# Patient Record
Sex: FEMALE | Race: WHITE | NOT HISPANIC OR LATINO | URBAN - METROPOLITAN AREA
[De-identification: names, ages, dates, MRNs, and addresses within clinical notes are randomized per-mention and may not be internally consistent; named-entity substitution may affect disease eponyms.]

---
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
## 2022-04-11 ENCOUNTER — HISTORICAL (OUTPATIENT)
Dept: ADMINISTRATIVE | Facility: HOSPITAL | Age: 22
End: 2022-04-11

## 2022-04-27 VITALS — HEIGHT: 62 IN | WEIGHT: 108 LBS | BODY MASS INDEX: 19.88 KG/M2

## 2022-05-04 NOTE — HISTORICAL [facility] CERNER
This is a historical note converted from [place]. Formatting and pictures may have been removed.  Please reference [place] for original formatting and attached multimedia. Chief Complaint  New patient here with left foot pain x2 days after a basketball game where foot turned sideways and had immediate pain and bruising. Trainer made apt today. pain with amb.  History of Present Illness  She is a pleasant 18-year-old female whose had left foot pain after injuring her?foot in a basketball game?on 12/15/2018.? She sort of twisted the foot. ?She had immediate pain and some swelling. ?Pain is located over her fifth metatarsal. ?She notes it worse with ambulation. ?Somewhat better with rest.? She denies any numbness or tingling  Review of Systems  Comprehensive review of system?was performed with no exceptions other than noted in the history of present illness  Physical Exam  Vitals & Measurements  HT:?158?cm? HT:?158?cm? WT:?49?kg? WT:?49?kg? BMI:?19.63?  Gen: WN, WD, NAD  Card/Res: NL breathing, +distal pulses  Abdomen: ND  Muscular skeletal: She has some tenderness palpation over her fifth metatarsal.? She has some expected ecchymosis and bruising. ?She has decreased range of motion?of her ankle?secondary to pain.? Her sensation is intact light touch. ?+ DP pulse  Assessment/Plan  1.?Foot sprain?S93.604T  We will get her into a boot and have her weight-bear in the boot. ?I will start some physical therapy and anti-inflammatory medicines.? I will see her back in a couple weeks for reevaluation hope we will be able to wean the boot and get her back to play quickly  Ordered:  Clinic Follow up, *Est. 12/31/18 3:00:00 CST, Order for future visit, Foot sprain, [facility]  Office/Outpatient Visit Level 3 New [id number] [facility], Foot sprain, [facility], 12/17/18 14:45:00 CST  ?  Orders:  XR Foot Left Minimum 3 Views, Routine, 12/17/18 14:23:00 CST, Pain, None, Ambulatory, Rad Type, Left foot pain, Not Scheduled, 12/17/18 14:23:00  CST   Problem List/Past Medical History  Ongoing  No chronic problems  Historical  No qualifying data  Procedure/Surgical History  None   Medications  No active medications  Allergies  No Known Medication Allergies  Social History  Alcohol  Never, 12/17/2018  Employment/School  Student, Work/School description: 12th grade., 12/17/2018  Substance Abuse  Never, 12/17/2018  Tobacco  Never smoker, No, 12/17/2018  Family History  Cancer 09-AUG-2016 15:28:46<$>: Mother.  Health Maintenance  Health Maintenance  ???Pending?(in the next year)  ??? ??Due?  ??? ? ? ?Adolescent Depression Screening due??12/17/18??and every 1??year(s)  ???Satisfied?(in the past 1 year)  ??? ??Satisfied?  ??? ? ? ?Body Mass Index Check on??12/17/18.??Satisfied by SYSTEM  ?  ?  Diagnostic Results  Foot radiographs 12/17/2018: 3 views the foot show no fracture